# Patient Record
Sex: FEMALE | Race: WHITE | Employment: STUDENT | URBAN - METROPOLITAN AREA
[De-identification: names, ages, dates, MRNs, and addresses within clinical notes are randomized per-mention and may not be internally consistent; named-entity substitution may affect disease eponyms.]

---

## 2022-01-30 ENCOUNTER — OFFICE VISIT (OUTPATIENT)
Dept: FAMILY MEDICINE CLINIC | Age: 22
End: 2022-01-30

## 2022-01-30 ENCOUNTER — HOSPITAL ENCOUNTER (OUTPATIENT)
Age: 22
Discharge: HOME | End: 2022-02-01
Payer: COMMERCIAL

## 2022-01-30 ENCOUNTER — HOSPITAL ENCOUNTER (OUTPATIENT)
Dept: GENERAL RADIOLOGY | Age: 22
Discharge: HOME | End: 2022-02-01
Payer: COMMERCIAL

## 2022-01-30 VITALS
DIASTOLIC BLOOD PRESSURE: 72 MMHG | TEMPERATURE: 97.7 F | SYSTOLIC BLOOD PRESSURE: 100 MMHG | HEIGHT: 64 IN | WEIGHT: 110 LBS | HEART RATE: 84 BPM | BODY MASS INDEX: 18.78 KG/M2 | OXYGEN SATURATION: 97 %

## 2022-01-30 DIAGNOSIS — M25.522 LEFT ELBOW PAIN: ICD-10-CM

## 2022-01-30 DIAGNOSIS — M25.522 LEFT ELBOW PAIN: Primary | ICD-10-CM

## 2022-01-30 PROCEDURE — 73080 X-RAY EXAM OF ELBOW: CPT

## 2022-01-30 PROCEDURE — 99203 OFFICE O/P NEW LOW 30 MIN: CPT | Performed by: NURSE PRACTITIONER

## 2022-01-30 RX ORDER — ETONOGESTREL AND ETHINYL ESTRADIOL .12; .015 MG/D; MG/D
RING VAGINAL
COMMUNITY
Start: 2022-01-18

## 2022-01-30 RX ORDER — FLUOXETINE 20 MG/1
TABLET, FILM COATED ORAL
COMMUNITY
Start: 2021-12-10

## 2022-01-30 RX ORDER — FLUOXETINE 10 MG/1
CAPSULE ORAL
COMMUNITY
Start: 2022-01-12

## 2022-01-30 SDOH — ECONOMIC STABILITY: FOOD INSECURITY: WITHIN THE PAST 12 MONTHS, YOU WORRIED THAT YOUR FOOD WOULD RUN OUT BEFORE YOU GOT MONEY TO BUY MORE.: NEVER TRUE

## 2022-01-30 SDOH — ECONOMIC STABILITY: FOOD INSECURITY: WITHIN THE PAST 12 MONTHS, THE FOOD YOU BOUGHT JUST DIDN'T LAST AND YOU DIDN'T HAVE MONEY TO GET MORE.: NEVER TRUE

## 2022-01-30 ASSESSMENT — PATIENT HEALTH QUESTIONNAIRE - PHQ9
SUM OF ALL RESPONSES TO PHQ QUESTIONS 1-9: 2
1. LITTLE INTEREST OR PLEASURE IN DOING THINGS: 1
SUM OF ALL RESPONSES TO PHQ QUESTIONS 1-9: 2
SUM OF ALL RESPONSES TO PHQ QUESTIONS 1-9: 2
2. FEELING DOWN, DEPRESSED OR HOPELESS: 1
SUM OF ALL RESPONSES TO PHQ QUESTIONS 1-9: 2
SUM OF ALL RESPONSES TO PHQ9 QUESTIONS 1 & 2: 2

## 2022-01-30 ASSESSMENT — SOCIAL DETERMINANTS OF HEALTH (SDOH): HOW HARD IS IT FOR YOU TO PAY FOR THE VERY BASICS LIKE FOOD, HOUSING, MEDICAL CARE, AND HEATING?: NOT HARD AT ALL

## 2022-01-30 NOTE — PROGRESS NOTES
Subjective  Deshawn Lee, 23 y.o. female presents today with:  Chief Complaint   Patient presents with   [facility] [place] ON ICE AND NOW HAS LEFT ELBOW PAIN        HPI     Patient presents for left elbow pain. State she fell on ice last night. Admits she slipped and fell with her arm stretched out to catch herself. Felt a sharp shooting pain from shoulder to wrist. States her elbow is swollen. States she is unable to extend her arm completely due to excruciating pain and feels that the elbow locks. Has tried motrin and tylenol which has helped, pain is okay if she does not fully extend the elbow. No past medical history on file. No Known Allergies    Current Outpatient Medications on File Prior to Visit   Medication Sig Dispense Refill    ELURYNG 0.12-0.015 MG/24HR vaginal ring       FLUoxetine (PROZAC) 20 MG tablet       FLUoxetine (PROZAC) 10 MG capsule        No current facility-administered medications on file prior to visit. Review of Systems   Constitutional: Negative for chills and fever. HENT: Negative for congestion, rhinorrhea and sore throat. Respiratory: Negative for cough, shortness of breath and wheezing. Cardiovascular: Negative for chest pain. Gastrointestinal: Negative for abdominal pain, diarrhea, nausea and vomiting. Musculoskeletal: Negative for back pain and myalgias. Left elbow pain and swelling   Skin: Negative for rash. Objective    Vitals:    01/30/22 1220   BP: 100/72   Site: Right Upper Arm   Position: Sitting   Cuff Size: Medium Adult   Pulse: 84   Temp: 97.7 °F (36.5 °C)   TempSrc: Temporal   SpO2: 97%   Weight: 110 lb (49.9 kg)   Height: 5' 4\" (1.626 m)       Physical Exam  Constitutional:       General: She is not in acute distress. Appearance: Normal appearance. She is not ill-appearing or toxic-appearing. HENT:      Head: Normocephalic and atraumatic.       Right Ear: Hearing and external ear normal.      Left Ear: Hearing and external ear normal.   Eyes:      General: Lids are normal.      Conjunctiva/sclera: Conjunctivae normal.   Cardiovascular:      Rate and Rhythm: Normal rate and regular rhythm. Heart sounds: Normal heart sounds. Pulmonary:      Effort: Pulmonary effort is normal. No respiratory distress. Breath sounds: Normal breath sounds. No decreased breath sounds, wheezing, rhonchi or rales. Musculoskeletal:      Left elbow: Swelling and effusion present. No deformity or lacerations. Decreased range of motion. Tenderness present in medial epicondyle and olecranon process. Cervical back: Normal range of motion and neck supple. Skin:     General: Skin is warm and dry. Neurological:      General: No focal deficit present. Mental Status: She is alert and oriented to person, place, and time. Psychiatric:         Attention and Perception: Attention normal.         Mood and Affect: Mood normal.         Speech: Speech normal.         Behavior: Behavior normal.         Thought Content: Thought content normal.         Cognition and Memory: Cognition normal.         Judgment: Judgment normal.       POC Testing Today: No results found for this visit on 01/30/22. Charles Borrego was seen today for fall. Diagnoses and all orders for this visit:    Left elbow pain  -     XR ELBOW LEFT (MIN 3 VIEWS); Future  - Will obtain XR. Left elbow wrapped with ace wrap and arm placed in a splint at 90 degree angle. Will send to ortho after obtaining xray, also advised may go to  walk in clinic. Return if symptoms worsen or fail to improve, for follow up. Side effects and adverse effects of any medication prescribed today, as well as treatment plan/rationale, follow-up care, and result expectations have been discussed with the patient. Expresses understanding and desires to proceed with treatment plan.       Discussed signs and symptoms which require immediate follow-up in ED/call to 911. Understanding verbalized. I have reviewed and updated the electronic medical record.     KEREN Palacios - CNP

## 2022-01-31 DIAGNOSIS — M25.422 EFFUSION OF LEFT ELBOW: Primary | ICD-10-CM

## 2022-02-06 ASSESSMENT — ENCOUNTER SYMPTOMS
DIARRHEA: 0
SHORTNESS OF BREATH: 0
SORE THROAT: 0
VOMITING: 0
RHINORRHEA: 0
BACK PAIN: 0
ABDOMINAL PAIN: 0
WHEEZING: 0
NAUSEA: 0
COUGH: 0

## 2022-05-02 ENCOUNTER — HOSPITAL ENCOUNTER (OUTPATIENT)
Dept: ULTRASOUND IMAGING | Age: 22
Discharge: HOME | End: 2022-05-04
Payer: COMMERCIAL

## 2022-05-02 DIAGNOSIS — R10.2 VAGINAL PAIN: ICD-10-CM

## 2022-05-02 PROCEDURE — 76830 TRANSVAGINAL US NON-OB: CPT

## 2022-05-02 PROCEDURE — 76856 US EXAM PELVIC COMPLETE: CPT
